# Patient Record
Sex: FEMALE | Race: WHITE
[De-identification: names, ages, dates, MRNs, and addresses within clinical notes are randomized per-mention and may not be internally consistent; named-entity substitution may affect disease eponyms.]

---

## 2017-02-10 ENCOUNTER — HOSPITAL ENCOUNTER (EMERGENCY)
Dept: HOSPITAL 80 - CED | Age: 22
Discharge: HOME | End: 2017-02-10
Payer: MEDICAID

## 2017-02-10 VITALS
SYSTOLIC BLOOD PRESSURE: 96 MMHG | DIASTOLIC BLOOD PRESSURE: 54 MMHG | OXYGEN SATURATION: 95 % | TEMPERATURE: 98.5 F | RESPIRATION RATE: 18 BRPM | HEART RATE: 78 BPM

## 2017-02-10 DIAGNOSIS — B34.9: Primary | ICD-10-CM

## 2017-02-10 DIAGNOSIS — Z87.891: ICD-10-CM

## 2017-02-10 NOTE — UCPHY
H & P


Patient Type: Established


Chief Complaint Nursing Narrative: c/o pain to back and chest decribed as " 

lung pain", chills, her nostrils burn and cough x 3 days


Time Seen by Provider: 02/10/17 14:25


HPI/ROS: 


Chief complaint:  Cough, congestion, sore throat





HPI:  Patient is presenting with 3 days of worsening cough productive of 

greenish sputum, pain in her back when she coughs.  Sinus congestion and sore 

throat.  Patient does state that she quit smoking 1 week ago.  Last cigarette 

was at about that time.  Some subjective fevers at home but no chills. No 

nausea or vomiting.  Mild headache. No ear pain. No pain with swallowing.  No 

nausea vomiting or abdominal pain. No chest pain or shortness of breath.





ROS:  10 point Review of Systems is negative except as noted in the HPI.





Physical exam:


Gen: Awake, Alert, No Distress


HEENT: 


     Ears: Bilateral TMs are normal, no erythema or bulging.  External auditory 

canals are clear.


     Nose: no rhinorrhea


     Eyes: PERRLA, EOMI


     Mouth: Moist mucosa 


Neck: Supple, no JVD


Chest: nontender, lungs clear to auscultation


Heart: S1, S2 normal, no murmur


Abd: Soft, non-tender, no guarding


Back: no CVA tenderness, no midline tenderness 


Ext: no edema, non-tender


Skin: no rash


Neuro: CN II-XII intact, Sensation grossly intact, Strength 5/5 in bilateral 

upper and lower extremities





- Personal History


LMP (Females 10-55): 22-28 Days Ago





- Medical/Surgical History


Hx Asthma: No


Hx Chronic Respiratory Disease: No


Hx Diabetes: No


Hx Cardiac Disease: No


Hx Renal Disease: No


Hx Cirrhosis: No


Hx Alcoholism: No


Hx HIV/AIDS: No


Hx Splenectomy or Spleen Trauma: No


Other PMH: denies





- Family History


Significant Family History: No pertinent family hx





- Social History


Smoking Status: Former smoker


Constitutional: 


 Initial Vital Signs











Temperature (C)  36.9 C   02/10/17 14:07


 


Heart Rate  78   02/10/17 14:07


 


Respiratory Rate  18   02/10/17 14:07


 


Blood Pressure  96/54 L  02/10/17 14:07


 


O2 Sat (%)  95   02/10/17 14:07








 











O2 Delivery Mode               Room Air














Allergies/Adverse Reactions: 


 





azithromycin [From Zithromax] Allergy (Intermediate, Verified 01/22/14 15:47)


 


amoxicillin [Amoxicillin] Allergy (Verified 01/22/14 15:47)


 Rash








Home Medications: 














 Medication  Instructions  Recorded


 


Medroxyprogesterone Acetate 150 mg IM 11/14/12





[Depo-Provera]  














Medical Decision Making


ED Course/Re-evaluation: 


21-year-old female who quit smoking a week ago who is now having a worsening 

productive cough.  She is afebrile.  She quit smoking a week ago.  Her lungs 

are clear on exam.  Ears are clear sinus sinuses are not tender to percussion, 

there is no pharyngeal erythema.  I believe her symptoms are consistent with 

recently quitting smoking and more production cough and a possible viral upper 

respiratory infection that has seen large amounts of today.  I do not see any 

indications for antibiotics at this time.  She is otherwise well-appearing will 

discharge with instructions to follow up with her primary care physician in 4-5 

days.  If symptoms are not improved at that time additional therapies can be 

considered.  She is not wheezing.  She is otherwise well-appearing.





Departure





- Departure


Disposition: Home, Routine, Self-Care


Clinical Impression: 


 Viral upper respiratory illness


Condition: Good


Instructions:  Viral Syndrome (ED)


Additional Instructions: 


Follow up with your primary care physician in 4-5 days if symptoms are not 

improving.  


You may take ibuprofen alternating with acetaminophen every 4 hours as needed 

for fevers, chills, aches, or pains.


Referrals: 


Lischwe,Thomas, MD [Primary Care Provider] - As per Instructions





- PQRS


PQRS Measurement: 


NA

## 2017-07-07 ENCOUNTER — HOSPITAL ENCOUNTER (EMERGENCY)
Dept: HOSPITAL 80 - CED | Age: 22
Discharge: HOME | End: 2017-07-07
Payer: MEDICAID

## 2017-07-07 VITALS
RESPIRATION RATE: 12 BRPM | OXYGEN SATURATION: 95 % | SYSTOLIC BLOOD PRESSURE: 97 MMHG | DIASTOLIC BLOOD PRESSURE: 67 MMHG | TEMPERATURE: 98.8 F | HEART RATE: 79 BPM

## 2017-07-07 DIAGNOSIS — F17.200: ICD-10-CM

## 2017-07-07 DIAGNOSIS — J02.9: Primary | ICD-10-CM

## 2017-07-07 NOTE — EDPHY
H & P


Stated Complaint: sore throat, painful lymph glands n right neck


Time Seen by Provider: 07/07/17 19:03


HPI/ROS: 





CHIEF COMPLAINT:  Sore throat





HISTORY OF PRESENT ILLNESS:  The patient is a 21-year-old female who reports 

history of frequent strep throat who comes to the emergency department 

complaining of a sore throat and lymphadenopathy.  No fevers or chills.  No 

nausea vomiting. No cough.  No sinus congestion.  No ear aches.








REVIEW OF SYSTEMS:


Constitutional:  See HPI 


EENTM:  See HPI


Respiratory: denies: cough, shortness of breath


Cardiac: denies: chest pain, irregular heart rate, lightheadedness, palpitations


Gastrointestinal/Abdominal: denies: abdominal pain, diarrhea, nausea, vomiting, 

blood streaked stools


Genitourinary: denies: dysuria, frequency, hematuria, pain


Musculoskeletal: denies: joint pain, muscle pain


Skin: denies: lesions, rash, jaundice, bruising


Neurological: denies: headache, numbness, paresthesia, tingling, dizziness, 

weakness


Hematologic/Lymphatic: denies: blood clots, easy bleeding, easy bruising


Immunologic/allergic: denies: HIV/AIDS, transplant








EXAM:


GENERAL:  Well-appearing, well-nourished and in no acute distress.


HEAD:  Atraumatic, normocephalic.


EYES:  Pupils equal round and reactive to light, extraocular movements intact, 

sclera anicteric, conjunctiva are normal.


ENT:  TMs normal, nares patent, oropharynx  erythematous with mild exudates.  

Moist mucous membranes.


NECK:  Normal range of motion, supple without lymphadenopathy or JVD.


LUNGS:  Breath sounds clear to auscultation bilaterally and equal.  No wheezes 

rales or rhonchi.


HEART:  Regular rate and rhythm without murmurs, rubs or gallops.


ABDOMEN:  Soft, nontender, normoactive bowel sounds.  No guarding, no rebound.  

No masses appreciated.


BACK:  No CVA tenderness, no spinal tenderness, step-offs or deformities


EXTREMITIES:  Normal range of motion, no pitting or edema.  No clubbing or 

cyanosis.


NEUROLOGICAL:  Cranial nerves II through XII grossly intact.  Normal speech, 

normal gait.  5/5 strength, normal movement in all extremities, normal sensation


PSYCH:  Normal mood, normal affect.


SKIN:  Warm, dry, normal turgor, no visible rashes or lesions.








Source: Patient


Exam Limitations: No limitations





- Personal History


LMP (Females 10-55): Extended Cycle BCP/Inj


Current Tetanus Diphtheria and Acellular Pertussis (TDAP): Yes


Tetanus Vaccine Date: 2000





- Medical/Surgical History


Hx Asthma: No


Hx Chronic Respiratory Disease: No


Hx Diabetes: No


Hx Cardiac Disease: No


Hx Renal Disease: No


Hx Cirrhosis: No


Hx Alcoholism: No


Hx HIV/AIDS: No


Hx Splenectomy or Spleen Trauma: No


Other PMH: denies





- Family History


Significant Family History: No pertinent family hx





- Social History


Smoking Status: Current some day smoker


Alcohol Use: Sober


Drug Use: None


Constitutional: 


 Initial Vital Signs











Temperature (C)  37.1 C   07/07/17 18:46


 


Heart Rate  79   07/07/17 18:46


 


Respiratory Rate  12   07/07/17 18:46


 


Blood Pressure  97/67 L  07/07/17 18:46


 


O2 Sat (%)  95   07/07/17 18:46








 











O2 Delivery Mode               Room Air














Allergies/Adverse Reactions: 


 





azithromycin [From Zithromax] Allergy (Intermediate, Verified 07/07/17 18:45)


 


amoxicillin [Amoxicillin] Allergy (Verified 07/07/17 18:45)


 Rash








Home Medications: 














 Medication  Instructions  Recorded


 


Clindamycin HCl [Clindamycin] 300 mg PO TID #30 cap 07/07/17














Medical Decision Making


ED Course/Re-evaluation: 





The patient has symptoms consistent with strep throat.  She is asking for 

antibiotics because she states she was test negative initially and then the 

next day comes back positive. I will start her on a Z-Emre.  She is happy with 

this plan and declines further workup or testing.  She is allergic to 

azithromycin amoxicillin.  We will try clindamycin.


Differential Diagnosis: 





Partial list of the Differential diagnosis considered include but were not 

limited to;   viral pharyngitis, strep throat, and although unlikely based on 

the history and physical exam, I also considered pneumonia, meningitis, sepsis.

  I discussed these differential diagnoses and the plan with the patient as 

well as the usual and expected course.  The patient understands that the 

diagnosis is provisional and that in medicine we are not always correct and 

that further workup is often warranted.  Usual and customary warnings were 

given.  All of the patient's questions were answered.  The patient was 

instructed to return to the emergency department should the symptoms at all 

worsen or return, otherwise to followup with the physician as we discussed.





- Data Points


Laboratory Results: 


 











  07/07/17 07/07/17





  Unknown 18:50


 


Group A Strep Screen    NEGATIVE 





    (NEGATIVE) 


 


Group A Strep DNA  Pending   





   














Departure





- Departure


Disposition: Home, Routine, Self-Care


Clinical Impression: 


Acute pharyngitis


Qualifiers:


 Pharyngitis/tonsillitis etiology: unspecified etiology Qualified Code(s): 

J02.9 - Acute pharyngitis, unspecified





Condition: Fair


Instructions:  Pharyngitis (ED)


Referrals: 


Lischwe,Thomas, MD [Primary Care Provider] - As per Instructions


Stand Alone Forms:  Work Excuse


Prescriptions: 


Clindamycin HCl [Clindamycin] 300 mg PO TID #30 cap

## 2017-10-31 ENCOUNTER — HOSPITAL ENCOUNTER (EMERGENCY)
Dept: HOSPITAL 80 - CED | Age: 22
Discharge: HOME | End: 2017-10-31
Payer: MEDICAID

## 2017-10-31 VITALS — OXYGEN SATURATION: 98 % | TEMPERATURE: 97.7 F | RESPIRATION RATE: 16 BRPM

## 2017-10-31 VITALS — SYSTOLIC BLOOD PRESSURE: 118 MMHG | DIASTOLIC BLOOD PRESSURE: 76 MMHG | HEART RATE: 89 BPM

## 2017-10-31 DIAGNOSIS — B96.20: ICD-10-CM

## 2017-10-31 DIAGNOSIS — F17.200: ICD-10-CM

## 2017-10-31 DIAGNOSIS — N30.90: Primary | ICD-10-CM

## 2017-10-31 NOTE — EDPHY
H & P


Time Seen by Provider: 10/31/17 03:45


HPI/ROS: 





CHIEF COMPLAINT:  Dysuria and Frequency





HISTORY OF PRESENT ILLNESS:  22-year-old female good health.   had some 

mild dysuria approximately 24 hours ago.  However reoccurred some 3 hours ago.  

Thus the evaluation the wee hours of the morning.  She was having some trouble 

sleeping due to the frequency. 





Fever none


Chills none


Rigors none


flank pain none


abdominal pain none





Exposure:  She is sexually active without birth control.  She does not use 

condoms.  She is scheduled on  to have a physical and having normal 

her PCP's.  She denies any vaginal discharge.  Declines any STD testing at this 

time,





She was on Depo-Provera for a couple years.  Then for approximately year been 

on PCP's until recently as noted above.  Her menses since finishing the PCP's 

have been irregular - last menses was approximately 2 weeks ago and then she 

started noticing blood today.





REVIEW OF SYSTEMS:


Gastrointestinal:  No vomiting, no abdominal pain.


Gyn:  No discharge or lesions.





Smoking Status: Current some day smoker


Physical Exam: 





Gen:  Afebrile.  WD. WN.  Nontoxic.


Abdomen:  .  Nondistended.  Soft and nontender.  No CVA tenderness





Constitutional: 


 Initial Vital Signs











Temperature (C)  36.5 C   10/31/17 03:44


 


Heart Rate  99   10/31/17 03:44


 


Respiratory Rate  16   10/31/17 03:44


 


Blood Pressure  120/83 H  10/31/17 03:44


 


O2 Sat (%)  98   10/31/17 03:44








 











O2 Delivery Mode               Room Air














Allergies/Adverse Reactions: 


 





azithromycin [From Zithromax] Allergy (Intermediate, Verified 10/31/17 03:46)


 Hives


amoxicillin [Amoxicillin] Allergy (Verified 10/31/17 03:46)


 Rash








Home Medications: 














 Medication  Instructions  Recorded


 


Nitrofurantoin Macrocrystal 100 mg PO BID #10 capsule 10/31/17





[Nitrofurantoin]  


 


Phenazopyridine HCl [Pyridium] 200 mg PO TID #6 tab 10/31/17














Medical Decision Making


ED Course/Re-evaluation: 





Urinalysis is suspicious for cystitis:


2+ nitrates


Leukocyte esterase positive


Trace epithelial cells


1+ bacteria





Specimen will be cultured, local biogram Bactrim resistance at 30% = thus 

Nitrofurantoin = 96% sensitivity





UA preg neg, high SG.





Differential Diagnosis: 





Differential diagnosis includes but is not limited to:


Urinary tract infection, pyelonephritis, cystitis, ureterolithiasis, kidney 

stone, urinary retention].








- Data Points


Laboratory Results: 


 











  10/31/17 10/31/17





  03:39 03:39


 


Urine Color    YELLOW 





   


 


Urine Appearance    CLEAR 





   


 


Urine pH    6.5 





    (5.0-7.5) 


 


Ur Specific Gravity    >= 1.030 





    (1.002-1.030) 


 


Urine Protein    2+  H 





    (NEGATIVE) 


 


Urine Ketones    NEGATIVE 





    (NEGATIVE) 


 


Urine Blood    2+  H 





    (NEGATIVE) 


 


Urine Nitrate    POSITIVE  H 





    (NEGATIVE) 


 


Urine Bilirubin    NEGATIVE 





    (NEGATIVE) 


 


Urine Urobilinogen    0.2 EU EU





    (0.2-1.0) 


 


Ur Leukocyte Esterase    2+  H 





    (NEGATIVE) 


 


Urine RBC    3-5 /hpf H /hpf





    (0-3) 


 


Urine WBC    5-10 /hpf H /hpf





    (0-3) 


 


Ur Epithelial Cells    TRACE /lpf /lpf





    (NONE-1+) 


 


Urine Bacteria    1+ /hpf H /hpf





    (NONE SEEN) 


 


Urine Mucus    2+ /lpf H /lpf





    (NONE-1+) 


 


Urine Glucose    NEGATIVE 





    (NEGATIVE) 


 


Urine Pregnancy Test  NEGATIVE   





   














Departure





- Departure


Disposition: Home, Routine, Self-Care


Clinical Impression: 


 Cystitis





Condition: Good


Instructions:  Sulfamethoxazole/Trimethoprim (By mouth), Nitrofurantoin (By 

mouth), Phenazopyridine (By mouth), Urinary Tract Infection in Women (ED)


Additional Instructions: 


Empty her bladder frequently.  The best way to do this is to drink extra fluids.





Return if he develops flank pain or fever or nausea or vomiting





The urine specimen will be cultured.  If antibiotics are need to be changed be 

contacted





Use condoms with spermicide all agent until your next appointment as scheduled 

on .


Prescriptions: 


Nitrofurantoin Macrocrystal [Nitrofurantoin] 100 mg PO BID #10 capsule


Phenazopyridine HCl [Pyridium] 200 mg PO TID #6 tab

## 2017-12-21 ENCOUNTER — HOSPITAL ENCOUNTER (EMERGENCY)
Dept: HOSPITAL 80 - CED | Age: 22
Discharge: HOME | End: 2017-12-21
Payer: MEDICAID

## 2017-12-21 VITALS — RESPIRATION RATE: 16 BRPM | TEMPERATURE: 97.3 F

## 2017-12-21 VITALS — DIASTOLIC BLOOD PRESSURE: 80 MMHG | OXYGEN SATURATION: 96 % | SYSTOLIC BLOOD PRESSURE: 129 MMHG | HEART RATE: 82 BPM

## 2017-12-21 DIAGNOSIS — N30.00: Primary | ICD-10-CM

## 2017-12-21 DIAGNOSIS — F17.200: ICD-10-CM

## 2017-12-21 DIAGNOSIS — B96.89: ICD-10-CM

## 2017-12-21 NOTE — EDPHY
H & P


Stated Complaint: fEW DAYS AGO FREQENCY,URGENCY AND DYSURIA WITH ABD PAIN


Time Seen by Provider: 12/21/17 16:25


HPI/ROS: 





CHIEF COMPLAINT:  Dysuria, frequency





HISTORY OF PRESENT ILLNESS:  The patient is a 22-year-old female who comes to 

the emergency department complaining of dysuria and frequency that she states 

feels similar to her previous urinary tract infection in October.  She states 

that she has been using meth and read online that meth may cause more frequent 

urinary tract infections.  She denies risk of pregnancy.  She states that she 

is on birth control.  She has not had any vaginal bleeding or discharge. No GI 

symptoms. She has not had a fever.  She complains of intermittent cramping in 

her right lower quadrant but states that it currently is not bothering her.  No 

flank pain or back pain.








REVIEW OF SYSTEMS:


Constitutional:  denies: chills, fever, recent illness, recent injury


EENTM: denies: blurred vision, double vision, nose congestion


Respiratory: denies: cough, shortness of breath


Cardiac: denies: chest pain, irregular heart rate, lightheadedness, palpitations


Gastrointestinal/Abdominal: denies: abdominal pain, diarrhea, nausea, vomiting, 

blood streaked stools


Genitourinary:  See HPI


Musculoskeletal: denies: joint pain, muscle pain


Skin: denies: lesions, rash, jaundice, bruising


Neurological: denies: headache, numbness, paresthesia, tingling, dizziness, 

weakness


Hematologic/Lymphatic: denies: blood clots, easy bleeding, easy bruising


Immunologic/allergic: denies: HIV/AIDS, transplant








EXAM:


GENERAL:  Well-appearing, well-nourished and in no acute distress.


HEAD:  Atraumatic, normocephalic.


EYES:  Pupils equal round and reactive to light, extraocular movements intact, 

sclera anicteric, conjunctiva are normal.


ENT:  TMs normal, nares patent, oropharynx clear without exudates.  Moist 

mucous membranes.


NECK:  Normal range of motion, supple without lymphadenopathy or JVD.


LUNGS:  Breath sounds clear to auscultation bilaterally and equal.  No wheezes 

rales or rhonchi.


HEART:  Regular rate and rhythm without murmurs, rubs or gallops.


ABDOMEN:  Soft, nontender, normoactive bowel sounds.  No guarding, no rebound.  

No masses appreciated.


BACK:  No CVA tenderness, no spinal tenderness, step-offs or deformities


EXTREMITIES:  Normal range of motion, no pitting or edema.  No clubbing or 

cyanosis.


NEUROLOGICAL:  Cranial nerves II through XII grossly intact.  Normal speech, 

normal gait.  5/5 strength, normal movement in all extremities, normal sensation


PSYCH:  Normal mood, normal affect.


SKIN:  Warm, dry, normal turgor, no visible rashes or lesions.








Source: Patient


Exam Limitations: No limitations





- Personal History


LMP (Females 10-55): 22-28 Days Ago


Tetanus Vaccine Date: 2000





- Medical/Surgical History


Hx Asthma: No


Hx Chronic Respiratory Disease: No


Hx Diabetes: No


Hx Cardiac Disease: No


Hx Renal Disease: No


Hx Cirrhosis: No


Hx Alcoholism: No


Hx HIV/AIDS: No


Hx Splenectomy or Spleen Trauma: No


Other PMH: mED HX-NONE.  sURG-NONE





- Family History


Significant Family History: No pertinent family hx





- Social History


Smoking Status: Current some day smoker


Alcohol Use: Occasionally


Drug Use: Other


Constitutional: 


 Initial Vital Signs











Temperature (C)  36.3 C   12/21/17 16:20


 


Heart Rate  97   12/21/17 16:20


 


Respiratory Rate  16   12/21/17 16:20


 


Blood Pressure  127/77 H  12/21/17 16:20


 


O2 Sat (%)  97   12/21/17 16:20








 











O2 Delivery Mode               Room Air














Allergies/Adverse Reactions: 


 





azithromycin [From Zithromax] Allergy (Intermediate, Verified 12/21/17 16:19)


 Hives


amoxicillin [Amoxicillin] Allergy (Verified 12/21/17 16:19)


 Rash








Home Medications: 














 Medication  Instructions  Recorded


 


Cephalexin [Keflex] 500 mg PO TID #21 cap 12/21/17


 


Phenazopyridine HCl [Pyridium] 200 mg PO TID #6 tab 12/21/17














Medical Decision Making


ED Course/Re-evaluation: 





I reviewed the patient's previous visit.  She had E coli that was sensitive to 

everything other than penicillin.  I will start her on Keflex.  Her exam is 

benign.  She is afebrile.  Will also treat her with azo for pain control.  She 

is happy with this and declines further workup or testing at this time.  We 

will send her urine for cultures.





5:30 p.m. we discussed the urinalysis.  The patient has mild red rash to the 

dorsum of both hands.  This started before she received any medications.  She 

states that she had previously with urine infections as well and it resolved 

with treatment.  We discussed options.  She states that Benadryl does not help.

  It is not warm to the touch.  It is not painful.  She does state that it 

happens after she using meth.  We discussed possible contaminant of the 

methamphetamine and encouraged tree to discontinue using meth.


Differential Diagnosis: 





Partial list of the Differential diagnosis considered include but were not 

limited to;  urinary tract infection, pyelonephritis and although unlikely 

based on the history and physical exam, I also considered ovarian cyst, 

appendicitis, kidney stone, pregnancy.  I discussed these differential 

diagnoses and the plan with the patient as well as the usual and expected 

course.  The patient understands that the diagnosis is provisional and that in 

medicine we are not always correct and that further workup is often warranted.  

Usual and customary warnings were given.  All of the patient's questions were 

answered.  The patient was instructed to return to the emergency department 

should the symptoms at all worsen or return, otherwise to followup with the 

physician as we discussed.





- Data Points


Laboratory Results: 


 











  12/21/17





  16:05


 


Urine Color  YELLOW 





  


 


Urine Appearance  HAZY 





  


 


Urine pH  5.5 





   (5.0-7.5) 


 


Ur Specific Gravity  1.010 





   (1.002-1.030) 


 


Urine Protein  NEGATIVE 





   (NEGATIVE) 


 


Urine Ketones  NEGATIVE 





   (NEGATIVE) 


 


Urine Blood  NEGATIVE 





   (NEGATIVE) 


 


Urine Nitrate  NEGATIVE 





   (NEGATIVE) 


 


Urine Bilirubin  NEGATIVE 





   (NEGATIVE) 


 


Urine Urobilinogen  0.2 EU EU





   (0.2-1.0) 


 


Ur Leukocyte Esterase  2+  H 





   (NEGATIVE) 


 


Urine RBC  1-3 /hpf /hpf





   (0-3) 


 


Urine WBC  5-10 /hpf H /hpf





   (0-3) 


 


Ur Epithelial Cells  TRACE /lpf /lpf





   (NONE-1+) 


 


Urine Mucus  TRACE /lpf /lpf





   (NONE-1+) 


 


Urine Glucose  NEGATIVE 





   (NEGATIVE) 











Medications Given: 


 








Discontinued Medications





Cephalexin HCl (Keflex)  500 mg PO EDNOW ONE


   PRN Reason: Protocol


   Stop: 12/21/17 16:33


   Last Admin: 12/21/17 17:12 Dose:  500 mg


Phenazopyridine HCl (Pyridium)  200 mg PO EDNOW ONE


   Stop: 12/21/17 16:33


   Last Admin: 12/21/17 17:10 Dose:  200 mg








Departure





- Departure


Disposition: Home, Routine, Self-Care


Clinical Impression: 


Urinary tract infection


Qualifiers:


 Urinary tract infection type: acute cystitis Hematuria presence: without 

hematuria Qualified Code(s): N30.00 - Acute cystitis without hematuria





Condition: Fair


Instructions:  Cephalexin (By mouth), Phenazopyridine (By mouth), Urinary Tract 

Infection in Women (ED)


Referrals: 


Lischwe,Thomas, MD [Primary Care Provider] - As per Instructions


Prescriptions: 


Cephalexin [Keflex] 500 mg PO TID #21 cap


Phenazopyridine HCl [Pyridium] 200 mg PO TID #6 tab

## 2018-01-01 ENCOUNTER — HOSPITAL ENCOUNTER (EMERGENCY)
Dept: HOSPITAL 80 - CED | Age: 23
Discharge: HOME | End: 2018-01-01
Payer: MEDICAID

## 2018-01-01 VITALS — TEMPERATURE: 98 F | RESPIRATION RATE: 18 BRPM

## 2018-01-01 VITALS — DIASTOLIC BLOOD PRESSURE: 62 MMHG | SYSTOLIC BLOOD PRESSURE: 124 MMHG | OXYGEN SATURATION: 97 %

## 2018-01-01 VITALS — HEART RATE: 74 BPM

## 2018-01-01 DIAGNOSIS — F17.200: ICD-10-CM

## 2018-01-01 DIAGNOSIS — F19.10: ICD-10-CM

## 2018-01-01 DIAGNOSIS — E86.9: ICD-10-CM

## 2018-01-01 DIAGNOSIS — K59.09: Primary | ICD-10-CM

## 2018-01-01 LAB — PLATELET # BLD: 323 10^3/UL (ref 150–400)

## 2018-01-01 NOTE — EDPHY
H & P


Stated Complaint: here last week for UTI - on antibotics with lower abd pain/

bloody stool x 1





- Personal History


LMP (Females 10-55): Irregular


Current Tetanus Diphtheria and Acellular Pertussis (TDAP): Yes


Tetanus Vaccine Date: 2000





- Medical/Surgical History


Hx Asthma: No


Hx Chronic Respiratory Disease: No


Hx Diabetes: No


Hx Cardiac Disease: No


Hx Renal Disease: No


Hx Cirrhosis: No


Hx Alcoholism: No


Hx HIV/AIDS: No


Hx Splenectomy or Spleen Trauma: No


Other PMH: mED HX-NONE.  sURG-NONE





- Social History


Smoking Status: Current some day smoker





<Alexis Fernandez - Last Filed: 01/01/18 14:52>


Source: Patient





<Rylan Gil - Last Filed: 01/01/18 16:23>


Constitutional: 


 Initial Vital Signs











Temperature (C)  36.6 C   01/01/18 14:31


 


Heart Rate  85   01/01/18 14:31


 


Respiratory Rate  18   01/01/18 14:31


 


Blood Pressure  111/88 H  01/01/18 14:31


 


O2 Sat (%)  96   01/01/18 14:31








 











O2 Delivery Mode               Room Air














Allergies/Adverse Reactions: 


 





azithromycin [From Zithromax] Allergy (Intermediate, Verified 12/21/17 16:19)


 Hives


amoxicillin [Amoxicillin] Allergy (Verified 12/21/17 16:19)


 Rash








Home Medications: 














 Medication  Instructions  Recorded


 


Cephalexin [Keflex] 500 mg PO TID #21 cap 12/21/17


 


Phenazopyridine HCl [Pyridium] 200 mg PO TID #6 tab 12/21/17


 


Bisacodyl [Dulcolax] 5 mg PO BID #10 tablet. 01/01/18


 


Bisacodyl/Naph,Mb-Db [Fleet Prep 1 each MC BID #1 kit 01/01/18





Kit #1]  


 


Polyethylene Glycol 3350 [Miralax 17 gm PO DAILY #4 pkt 01/01/18





17 gm (*)]  














Medical Decision Making





<Alexis Fernandez - Last Filed: 01/01/18 14:52>





<Rylan Gil - Last Filed: 01/01/18 16:23>





- Diagnostics


Imaging Results: 


 Imaging Impressions





Abdomen CT  01/01/18 15:05


Impression:


 


1. Normal CT appearance of the appendix.


2. Constipation, particularly pronounced in the distal descending colon and 

rectosigmoid, with some mild posterior rectal-presacral edema.


3. Query mild gastroduodenal dysmotile syndrome.


4. Normal appearance of the appendix.


 


Findings were discussed with Rylan Gil MD at 16:13, on 1/1/2018.


 











ED Course/Re-evaluation: 





CHIEF COMPLAINT:  Abdominal pain and diarrhea





HISTORY OF PRESENT ILLNESS:  22-year-old female who was here about a week ago 

and treated with cephalexin for a documented urinary tract infection.  She took 

her last pill last night.  This morning she started to have some diarrhea and 

pain in her right mid gut.  She denies fevers or chills. She denies nausea 

vomiting.  She has not have any more urinary symptoms and believes that it has 

been improved with the antibiotics.





REVIEW OF SYSTEMS:  





A 10 point review of systems was performed and is negative with the exception 

of the elements mentioned in the history of present illness.





PHYSICAL EXAM:  





HR, BP, O2 Sat, RR.  Temp noted


General Appearance:  Alert, well hydrated, appropriate, and non-toxic appearing.


Head:  Atraumatic without scalp tenderness or obvious injury


Eyes:  Pupils equal, round, reactive to light and accommodation, EOMI, no trauma

, no injection.


Ears:  Clear bilaterally, no perforation, normal landmarks


Nose:  Atraumatic, no rhinorrhea, clear.


Throat:  There is no erythema or exudates, no lesions, normal tonsils, mucus 

membranes moist.


Neck:  Supple, 2+ carotid upstroke, nontender, no lymphadenopathy.


Respiratory:  No retractions, no distress, no wheezes, and no accessory muscle 

use.  Lungs are clear to auscultation bilaterally.


Cardiovascular:  Regular rate and rhythm, no murmurs, rubs, or gallops. 

Bilateral carotid, radial, dorsalis pedis, and posterior tibial pulses intact. 

Good capillary refill all extremities.


Gastrointestinal:  Abdomen is soft, tenderness in the right pericolic gutter 

area higher than McBurney's point and lower that her gallbladder, non-distended

, no masses, no rebound, no guarding, no peritoneal signs.


Musculoskeletal:  Normal active ROM of all extremities, atraumatic.


Neurological:  Alert, appropriate, and interactive.  The patient has normal 

DTRs and non-focal cranial nerves, motor, sensory, and cerebellar exam.


Skin:  No rashes, good turgor, no nodules on palpation.





Past medical history:  Recent UTI


Past surgical history:  None


Family history:  Noncontributory


Social history:  Single, uses tobacco, uses alcohol, does not abuse drugs, 

employed








DIFFERENTIAL DIAGNOSIS:   The differential diagnosis for the patient's 

abdominal pain included but was not limited to ovarian cyst, pelvic 

inflammatory disease, ovarian torsion, urinary tract infection, ectopic 

pregnancy, cholecystitis, and appendicitis.





MEDICAL DECISION MAKING:  Fall this patient is not in distress. She does have 

significant tenderness in the right pericolic gutter area however a very benign 

McBurney's point and benign right upper quadrant.  Since she was just on 

antibiotics and is having diarrhea it is unclear to me at this time if we need 

to image the patient.  I will order laboratory studies including a pregnancy 

since she is unsure if she may be pregnant.  We will also recheck her urine.  

We will reassess her abdomen and make a decision regarding imaging.  I will 

turn this patient over to Dr. Gil to evaluate her laboratory results and 

reassess her abdomen to make a decision regarding [facility] will imaging.   (

Alexis Fernandez)





1535:  Patient signed over to me at 3:00 p.m. shift change.


I did go and evaluate the patient she is resting comfortably.  She has focal 

right-sided abdominal pain. She does report that it moves up and down her right 

side.  She reports no fever no vomiting.  Denies diarrhea.  Infection tells me 

she has been constipated.  She salt dark blood around her stool today.


Denies urinary symptoms.


She is unsure if she is pregnant.





She reports to me that she did methamphetamine, as well as heroin and last 

night.








1617:  CT scan abdomen pelvis with IV contrast called to me by Dr. Gregory Rubin

:  This shows significant constipation with stool in the rectum.  However no 

acute inflammatory process visualized.  Normal appendix.





Here in emergency room will give a soapsuds enema.


Discussed blood work, and CT scan and urine results with the patient.





Additional drug screen noted positive for methamphetamine, cocaine and opioids.


Recommend refraining from doing these drugs.  Especially opioids as this can 

cause her constipation to be worse.


MiraLax prescription given.





Additionally she understands return emergency room she develops worsening 

abdominal pain fever vomiting blood in her stool.





1621:  Patient is refusing enema here in the emergency room she would like 

stool softeners to go home on.  I will prescribe MiraLax, Dulcolax a, as well 

as an enema for home.





I have discussed return precautions with her.  She understands return emergency 

room she develops worsening abdominal pain fever vomiting bloody stool.


Most likely cause of abdominal pain is severe constipation the setting of 

polysubstance abuse including IV heroin, cocaine use and methamphetamine.





I had recommended the patient she refrain from doing drugs specifically heroin, 

methamphetamine is very dangerous, and additionally cocaine.  Discussed this at 

length with her she understands. (Rylan Gil)





- Data Points


Laboratory Results: 


 Laboratory Results





 01/01/18 15:15 





 01/01/18 15:15 





 











  01/01/18 01/01/18 01/01/18





  15:25 15:15 15:15


 


WBC      





    


 


RBC      





    


 


Hgb      





    


 


Hct      





    


 


MCV      





    


 


MCH      





    


 


MCHC      





    


 


RDW      





    


 


Plt Count      





    


 


MPV      





    


 


Neut % (Auto)      





    


 


Lymph % (Auto)      





    


 


Mono % (Auto)      





    


 


Eos % (Auto)      





    


 


Baso % (Auto)      





    


 


Nucleat RBC Rel Count      





    


 


Absolute Neuts (auto)      





    


 


Absolute Lymphs (auto)      





    


 


Absolute Monos (auto)      





    


 


Absolute Eos (auto)      





    


 


Absolute Basos (auto)      





    


 


Absolute Nucleated RBC      





    


 


Immature Gran %      





    


 


Immature Gran #      





    


 


Sodium      139 mEq/L mEq/L





     (134-144) 


 


Potassium      3.9 mEq/L mEq/L





     (3.5-5.2) 


 


Chloride      97 mEq/L mEq/L





     () 


 


Carbon Dioxide      26 mEq/l mEq/l





     (22-31) 


 


Anion Gap      16 mEq/L mEq/L





     (8-16) 


 


BUN      9 mg/dL mg/dL





     (7-23) 


 


Creatinine      0.7 mg/dL mg/dL





     (0.6-1.0) 


 


Estimated GFR      > 60 





    


 


Glucose      98 mg/dL mg/dL





     () 


 


Calcium      9.8 mg/dL mg/dL





     (8.5-10.4) 


 


Total Bilirubin      0.9 mg/dL mg/dL





     (0.1-1.4) 


 


Conjugated Bilirubin      0.2 mg/dL mg/dL





     (0.0-0.5) 


 


Unconjugated Bilirubin      0.7 mg/dL mg/dL





     (0.0-1.1) 


 


AST      25 IU/L IU/L





     (14-46) 


 


ALT      28 IU/L IU/L





     (9-52) 


 


Alkaline Phosphatase      70 IU/L IU/L





     () 


 


Total Protein      7.5 g/dL g/dL





     (6.3-8.2) 


 


Albumin      4.2 g/dL g/dL





     (3.5-5.0) 


 


Lipase      39 IU/L IU/L





     () 


 


Beta HCG, Qual    NEGATIVE   





    


 


Urine Color  YELLOW     





    


 


Urine Appearance  CLEAR     





    


 


Urine pH  8.0  H     





   (5.0-7.5)   


 


Ur Specific Gravity  1.010     





   (1.002-1.030)   


 


Urine Protein  NEGATIVE     





   (NEGATIVE)   


 


Urine Ketones  NEGATIVE     





   (NEGATIVE)   


 


Urine Blood  NEGATIVE     





   (NEGATIVE)   


 


Urine Nitrate  NEGATIVE     





   (NEGATIVE)   


 


Urine Bilirubin  NEGATIVE     





   (NEGATIVE)   


 


Urine Urobilinogen  0.2 EU EU    





   (0.2-1.0)   


 


Ur Leukocyte Esterase  NEGATIVE     





   (NEGATIVE)   


 


Urine Glucose  NEGATIVE     





   (NEGATIVE)   


 


Urine Opiates Screen  NON-NEGATIVE  H     





   (NEGATIVE)   


 


Urine Barbiturates  NEGATIVE     





   (NEGATIVE)   


 


Ur Phencyclidine Scrn  NEGATIVE     





   (NEGATIVE)   


 


Ur Amphetamine Screen  NON-NEGATIVE  H     





   (NEGATIVE)   


 


U Benzodiazepines Scrn  NEGATIVE     





   (NEGATIVE)   


 


Urine Cocaine Screen  NON-NEGATIVE  H     





   (NEGATIVE)   


 


U Marijuana (THC) Screen  NEGATIVE     





   (NEGATIVE)   














  01/01/18





  15:15


 


WBC  11.25 10^3/uL H 10^3/uL





   (3.80-9.50) 


 


RBC  5.19 10^6/uL 10^6/uL





   (4.18-5.33) 


 


Hgb  15.5 g/dL g/dL





   (12.6-16.3) 


 


Hct  44.2 % %





   (38.0-47.0) 


 


MCV  85.2 fL fL





   (81.5-99.8) 


 


MCH  29.9 pg pg





   (27.9-34.1) 


 


MCHC  35.1 g/dL g/dL





   (32.4-36.7) 


 


RDW  11.6 % %





   (11.5-15.2) 


 


Plt Count  323 10^3/uL 10^3/uL





   (150-400) 


 


MPV  7.8 fL L fL





   (8.7-11.7) 


 


Neut % (Auto)  77.3 % H %





   (39.3-74.2) 


 


Lymph % (Auto)  15.3 % %





   (15.0-45.0) 


 


Mono % (Auto)  6.5 % %





   (4.5-13.0) 


 


Eos % (Auto)  0.2 % L %





   (0.6-7.6) 


 


Baso % (Auto)  0.3 % %





   (0.3-1.7) 


 


Nucleat RBC Rel Count  0.0 % %





   (0.0-0.2) 


 


Absolute Neuts (auto)  8.71 10^3/uL H 10^3/uL





   (1.70-6.50) 


 


Absolute Lymphs (auto)  1.72 10^3/uL 10^3/uL





   (1.00-3.00) 


 


Absolute Monos (auto)  0.73 10^3/uL 10^3/uL





   (0.30-0.80) 


 


Absolute Eos (auto)  0.02 10^3/uL L 10^3/uL





   (0.03-0.40) 


 


Absolute Basos (auto)  0.03 10^3/uL 10^3/uL





   (0.02-0.10) 


 


Absolute Nucleated RBC  0.00 10^3/uL 10^3/uL





   (0-0.01) 


 


Immature Gran %  0.4 % %





   (0.0-1.1) 


 


Immature Gran #  0.04 10^3/uL 10^3/uL





   (0.00-0.10) 


 


Sodium  





  


 


Potassium  





  


 


Chloride  





  


 


Carbon Dioxide  





  


 


Anion Gap  





  


 


BUN  





  


 


Creatinine  





  


 


Estimated GFR  





  


 


Glucose  





  


 


Calcium  





  


 


Total Bilirubin  





  


 


Conjugated Bilirubin  





  


 


Unconjugated Bilirubin  





  


 


AST  





  


 


ALT  





  


 


Alkaline Phosphatase  





  


 


Total Protein  





  


 


Albumin  





  


 


Lipase  





  


 


Beta HCG, Qual  





  


 


Urine Color  





  


 


Urine Appearance  





  


 


Urine pH  





  


 


Ur Specific Gravity  





  


 


Urine Protein  





  


 


Urine Ketones  





  


 


Urine Blood  





  


 


Urine Nitrate  





  


 


Urine Bilirubin  





  


 


Urine Urobilinogen  





  


 


Ur Leukocyte Esterase  





  


 


Urine Glucose  





  


 


Urine Opiates Screen  





  


 


Urine Barbiturates  





  


 


Ur Phencyclidine Scrn  





  


 


Ur Amphetamine Screen  





  


 


U Benzodiazepines Scrn  





  


 


Urine Cocaine Screen  





  


 


U Marijuana (THC) Screen  





  











Medications Given: 


 








Discontinued Medications





Sodium Chloride (Ns)  1,000 mls @ 0 mls/hr IV EDNOW ONE; Wide Open


   PRN Reason: Protocol


   Stop: 01/01/18 15:02


   Last Admin: 01/01/18 15:21 Dose:  1,000 mls








Departure





<Alexis Fernandez - Last Filed: 01/01/18 14:52>





<Rylan Gil - Last Filed: 01/01/18 16:23>





- Departure


Disposition: Home, Routine, Self-Care


Clinical Impression: 


 Polysubstance abuse





Abdominal pain


Qualifiers:


 Abdominal location: right upper quadrant Qualified Code(s): R10.11 - Right 

upper quadrant pain





Constipation


Qualifiers:


 Constipation type: other constipation type Qualified Code(s): K59.09 - Other 

constipation





Condition: Good


Instructions:  Constipation (ED), High Fiber Diet (ED)


Additional Instructions: 


1.  Return emergency room if you have worsening abdominal pain fever vomiting.


Referrals: 


Chaim Machado MD [Primary Care Provider] - As per Instructions


Prescriptions: 


Bisacodyl [Dulcolax] 5 mg PO BID #10 tablet.dr


Bisacodyl/Lori Farley [Fleet Prep Kit #1] 1 each MC BID #1 kit


Polyethylene Glycol 3350 [Miralax 17 gm (*)] 17 gm PO DAILY #4 pkt

## 2018-01-01 NOTE — EDPHY
H & P


Stated Complaint: here last week for UTI - on antibotics with lower abd pain/

bloody stool x 1


Source: Patient





- Personal History


LMP (Females 10-55): Irregular


Current Tetanus Diphtheria and Acellular Pertussis (TDAP): Yes


Tetanus Vaccine Date: 2000





- Medical/Surgical History


Hx Asthma: No


Hx Chronic Respiratory Disease: No


Hx Diabetes: No


Hx Cardiac Disease: No


Hx Renal Disease: No


Hx Cirrhosis: No


Hx Alcoholism: No


Hx HIV/AIDS: No


Hx Splenectomy or Spleen Trauma: No


Other PMH: mED HX-NONE.  sURG-NONE





- Social History


Smoking Status: Current some day smoker


Constitutional: 





 Initial Vital Signs











Temperature (C)  36.6 C   01/01/18 14:31


 


Heart Rate  85   01/01/18 14:31


 


Respiratory Rate  18   01/01/18 14:31


 


Blood Pressure  111/88 H  01/01/18 14:31


 


O2 Sat (%)  96   01/01/18 14:31








 











O2 Delivery Mode               Room Air














Allergies/Adverse Reactions: 


 





azithromycin [From Zithromax] Allergy (Intermediate, Verified 12/21/17 16:19)


 Hives


amoxicillin [Amoxicillin] Allergy (Verified 12/21/17 16:19)


 Rash








Home Medications: 














 Medication  Instructions  Recorded


 


Cephalexin [Keflex] 500 mg PO TID #21 cap 12/21/17


 


Phenazopyridine HCl [Pyridium] 200 mg PO TID #6 tab 12/21/17














Departure





- Departure


Referrals: 


Chaim Machado MD [Primary Care Provider] - As per Instructions

## 2018-02-20 ENCOUNTER — HOSPITAL ENCOUNTER (EMERGENCY)
Dept: HOSPITAL 80 - CED | Age: 23
Discharge: HOME | End: 2018-02-20
Payer: MEDICAID

## 2018-02-20 VITALS — OXYGEN SATURATION: 97 % | HEART RATE: 62 BPM | SYSTOLIC BLOOD PRESSURE: 104 MMHG | DIASTOLIC BLOOD PRESSURE: 64 MMHG

## 2018-02-20 VITALS — TEMPERATURE: 98 F | RESPIRATION RATE: 18 BRPM

## 2018-02-20 DIAGNOSIS — R10.9: Primary | ICD-10-CM

## 2018-02-20 DIAGNOSIS — F17.200: ICD-10-CM

## 2018-02-20 LAB — PLATELET # BLD: 264 10^3/UL (ref 150–400)

## 2018-02-20 NOTE — EDPHY
H & P


Time Seen by Provider: 02/20/18 10:06


HPI/ROS: 





22-year-old female presents complaining of right sided abdominal pain for 

several days.  She states she was recently treated for a possible sexually 

transmitted disease with an injection and doxycycline.  She later stated she 

was told that this was gonorrhea.








Review of systems


As per [facility]


General no fever no chills no weakness


HEENT no eye pain no eye discharge. No eye redness, no sore throat


Respiratory no cough, no shortness of breath


Cardiac no chest pain, no peripheral edema


GI positive abdominal pain, no diarrhea, no constipation, no nausea, no vomiting


  no flank pain, no hematuria, no dysuria


Musculoskeletal no myalgias, no joint pain


Heme  no easy bruising, no easy bleeding


Endo no polyuria, no polydipsia


Skin no rashes, no pruritus


Neuro no syncope, no dizziness, no headaches


Psych is no suicidal ideation, no homicidal ideation





Past Medical/Surgical History: 





History of heroin abuse


Gonorrhea


Social History: 





History of heroin abuse





Smoking Status: Current some day smoker


Physical Exam: 


22-year-old female alert and oriented no acute distress nontoxic appearance, 

afebrile





HEENT atraumatic normocephalic, extraocular muscles intact, anicteric


Oropharynx negative for erythema negative exudate, tolerating her own secretions


Neck supple no meningismus


Lungs clear to auscultation bilaterally


Heart regular rate and rhythm without murmur rub or gallop


Abdomen nondistended normoactive bowel sounds , right middle quadrant lateral 

tenderness to palpation no guarding no rebound


No ecchymoses no rash


Back no CVA tenderness, no step-offs, no spinal tenderness


Extremities no cyanosis clubbing or edema


Neuro alert and oriented, no focal deficits


Constitutional: 


 Initial Vital Signs











Temperature (C)  36.6 C   02/20/18 10:07


 


Heart Rate  72   02/20/18 10:07


 


Respiratory Rate  18   02/20/18 10:07


 


Blood Pressure  152/82 H  02/20/18 10:07


 


O2 Sat (%)  99   02/20/18 10:07








 











O2 Delivery Mode               Room Air














Allergies/Adverse Reactions: 


 





azithromycin [From Zithromax] Allergy (Intermediate, Verified 12/21/17 16:19)


 Hives


amoxicillin [Amoxicillin] Allergy (Verified 12/21/17 16:19)


 Rash








Home Medications: 














 Medication  Instructions  Recorded


 


Doxycycline Calcium  02/20/18














Medical Decision Making


ED Course/Re-evaluation: 





Patient seen and evaluated for right-sided abdominal pain.





Labs CBC CMP lipase all within normal limits





Ultrasound right upper quadrant negative for cholelithiasis


Ultrasound to look at appendix, unable to visualize appendix





Patient given 1 L IV normal saline, as well as Zofran 4 mg





Patient appears markedly improved





Impression


Gastritis





Plan


Discharge home


Follow-up primary care physician


Differential Diagnosis: 





Gastroenteritis, gastritis, pancreatitis, cholecystitis, appendicitis, 

diverticulitis





- Data Points


Laboratory Results: 


 Laboratory Results





 02/20/18 10:33 





 02/20/18 10:33 








Medications Given: 


 








Discontinued Medications





Sodium Chloride (Ns)  1,000 mls @ 0 mls/hr IV ONCE ONE


   PRN Reason: Wide Open


   Stop: 02/20/18 10:21


   Last Admin: 02/20/18 10:30 Dose:  1,000 mls


Ondansetron HCl (Zofran)  4 mg IVP EDNOW ONE


   Stop: 02/20/18 10:21


   Last Admin: 02/20/18 10:31 Dose:  4 mg








Departure





- Departure


Disposition: Home, Routine, Self-Care


Clinical Impression: 


 Abdominal pain





Condition: Good


Instructions:  Abdominal Pain (ED)


Referrals: 


Yanira Alberts DO [Primary Care Provider] - As per Instructions

## 2018-11-16 ENCOUNTER — HOSPITAL ENCOUNTER (EMERGENCY)
Dept: HOSPITAL 80 - CED | Age: 23
Discharge: HOME | End: 2018-11-16
Payer: MEDICAID

## 2018-11-16 VITALS — SYSTOLIC BLOOD PRESSURE: 120 MMHG | DIASTOLIC BLOOD PRESSURE: 74 MMHG

## 2018-11-16 DIAGNOSIS — A56.8: Primary | ICD-10-CM

## 2018-11-16 NOTE — EDPHY
H & P


Time Seen by Provider: 11/16/18 16:42


HPI/ROS: 





HPI





CHIEF COMPLAINT:  Possible STI.





HISTORY OF PRESENT ILLNESS:  23-year-old female, otherwise healthy denies any 

medical history presents emergency room stating that she is concerned she may 

have an STI.  She states her significant other that she has intercourse with 

was recently diagnosed with chlamydia.  She complains of burning when she 

urinates over the last 24 hr.  Concerned that she may have an STI.  Denies any 

fever, abdominal pain, flank pain, back pain, denies any vaginal discharge.  

Denies being pregnant.





Is noted she is allergic to azithromycin breaks out in a rash.





Past Medical History:  Denies significant medical history





Past Surgical History:  Denies significant surgical history





Social History:  Smokes tobacco, Vape, marijuana, alcohol, denies other illicit 

drugs.





Family History:  Noncontributory.








ROS   


REVIEW OF SYSTEMS:


10 Systems were reviewed and negative with the exception of the elements 

mentioned in the history of present illness.








Exam   


Constitutional  appears well nontoxic no acute distress, triage nursing summary 

reviewed, vital signs reviewed, awake/alert. 


Eyes   normal conjunctivae and sclera, EOMI, PERRLA. 


HENT   normal inspection, atraumatic, moist mucus membranes, no epistaxis, neck 

supple/ no meningismus, no raccoon eyes. 


Respiratory   clear to auscultation bilaterally, normal breath sounds, no 

respiratory distress, no wheezing. 


Cardiovascular   rate normal, regular rhythm, no murmur, no edema, distal 

pulses normal. 


Gastrointestinal   soft, non-tender, no rebound, no guarding, normal bowel 

sounds, no distension, no pulsatile mass. 


Genitourinary   no CVA tenderness. 


Musculoskeletal  no midline vertebral tenderness, full range of motion, no calf 

swelling, no tenderness of extremities, no meningismus, good pulses, 

neurovascularly intact.


Skin   pink, warm, & dry, no rash, skin atraumatic. 


Neurologic   awake, alert and oriented x 3, AAOx3, moves all 4 extremities 

equally, motor intact, sensory intact, CN II-XII intact, normal cerebellar, 

normal vision, normal speech. 


Psychiatric   normal mood/affect. 


Heme/Lymph/Immune   no lymphadenopathy.





Differential Diagnosis:  Includes but is not limited to in a particular order 

UTI, cystitis, STI





Medical Decision Making:  Plan for this patient check clean catch urine for 

urinalysis for UTI, dirty catch urine for STI, urine pregnancy test.





Re-evaluation:





Long discussion with the patient about impaired treatment she would like to be 

empirically treated but will send urine test.  She understands the STI testing 

will not come back immediately tonight.





Will give IM Rocephin 250 mg for gonorrhea treatment.  Additionally given her 

azithromycin reaction will give doxycycline for 1 week.





Return precautions discussed with the patient she develops worsening symptoms 

of urinary symptoms including fever, abdominal pain, vomiting or not doing well 

return to the ER she understands.








Urinalysis point care dip and urinalysis pregnancy dip negative.





Formal UA pending at lab as well as urine STI testing.


Source: Patient





- Personal History


Tetanus Vaccine Date: 2000





- Medical/Surgical History


Hx Asthma: No


Hx Chronic Respiratory Disease: No


Hx Diabetes: No


Hx Cardiac Disease: No


Hx Renal Disease: No


Hx Cirrhosis: No


Hx Alcoholism: No


Hx HIV/AIDS: No


Hx Splenectomy or Spleen Trauma: No


Other PMH: mED HX-NONE.  sURG-NONE





- Social History


Smoking Status: Current some day smoker


Constitutional: 


 Initial Vital Signs











Temperature (C)  36.6 C   11/16/18 16:44


 


Heart Rate  81   11/16/18 16:44


 


Respiratory Rate  20   11/16/18 16:44


 


Blood Pressure  124/80 H  11/16/18 16:44


 


O2 Sat (%)  97   11/16/18 16:44








 











O2 Delivery Mode               Room Air














Allergies/Adverse Reactions: 


 





azithromycin [From Zithromax] Allergy (Intermediate, Verified 12/21/17 16:19)


 Hives


amoxicillin [Amoxicillin] Allergy (Verified 12/21/17 16:19)


 Rash








Home Medications: 














 Medication  Instructions  Recorded


 


Doxycycline Hyclate 100 mg PO BID #14 capsule 11/16/18














Departure





- Departure


Disposition: Home, Routine, Self-Care


Clinical Impression: 


 STI (sexually transmitted infection)





Condition: Good


Instructions:  Chlamydia (ED), Sexually Transmitted Diseases (ED), Condom Use (

ED), Safe Sex (ED)


Referrals: 


NONE *PRIMARY CARE P,. [Primary Care Provider] - As per Instructions


Prescriptions: 


Doxycycline Hyclate 100 mg PO BID #14 capsule